# Patient Record
Sex: MALE | Race: WHITE | Employment: FULL TIME | URBAN - METROPOLITAN AREA
[De-identification: names, ages, dates, MRNs, and addresses within clinical notes are randomized per-mention and may not be internally consistent; named-entity substitution may affect disease eponyms.]

---

## 2021-06-23 ENCOUNTER — OFFICE VISIT (OUTPATIENT)
Dept: FAMILY MEDICINE CLINIC | Facility: CLINIC | Age: 31
End: 2021-06-23
Payer: COMMERCIAL

## 2021-06-23 VITALS
RESPIRATION RATE: 18 BRPM | WEIGHT: 212 LBS | BODY MASS INDEX: 28.1 KG/M2 | DIASTOLIC BLOOD PRESSURE: 80 MMHG | TEMPERATURE: 98 F | HEIGHT: 73 IN | SYSTOLIC BLOOD PRESSURE: 122 MMHG | HEART RATE: 62 BPM | OXYGEN SATURATION: 98 %

## 2021-06-23 DIAGNOSIS — Z00.00 ROUTINE GENERAL MEDICAL EXAMINATION AT A HEALTH CARE FACILITY: Primary | ICD-10-CM

## 2021-06-23 DIAGNOSIS — M25.511 CHRONIC RIGHT SHOULDER PAIN: ICD-10-CM

## 2021-06-23 DIAGNOSIS — Z72.0 CHEWING TOBACCO USE: ICD-10-CM

## 2021-06-23 DIAGNOSIS — Z23 NEED FOR VACCINATION: ICD-10-CM

## 2021-06-23 DIAGNOSIS — G89.29 CHRONIC RIGHT SHOULDER PAIN: ICD-10-CM

## 2021-06-23 DIAGNOSIS — T14.90XA SPORTS INJURY: ICD-10-CM

## 2021-06-23 PROCEDURE — 3074F SYST BP LT 130 MM HG: CPT | Performed by: FAMILY MEDICINE

## 2021-06-23 PROCEDURE — 99385 PREV VISIT NEW AGE 18-39: CPT | Performed by: FAMILY MEDICINE

## 2021-06-23 PROCEDURE — 90471 IMMUNIZATION ADMIN: CPT | Performed by: FAMILY MEDICINE

## 2021-06-23 PROCEDURE — 3008F BODY MASS INDEX DOCD: CPT | Performed by: FAMILY MEDICINE

## 2021-06-23 PROCEDURE — 90715 TDAP VACCINE 7 YRS/> IM: CPT | Performed by: FAMILY MEDICINE

## 2021-06-23 PROCEDURE — 3079F DIAST BP 80-89 MM HG: CPT | Performed by: FAMILY MEDICINE

## 2021-06-23 NOTE — PATIENT INSTRUCTIONS
Prevention Guidelines, Men Ages 25 to 44  Screening tests and vaccines are an important part of managing your health. A screening test is done to find possible disorders or diseases in people who don't have any symptoms.  The goal is to find a disease ear vaccine 2 doses; the second dose should be given at least 4 weeks after the first dose   Hepatitis A Men at increased risk for infection – talk with your healthcare provider 2 doses given at least 6 months apart   Hepatitis B Men at increased risk for infe be reminded to avoid intentional tanning and tanning beds. 1Those who are 25years of age, who are not up-to-date on their childhood immunizations, should get all appropriate catch-up vaccines recommended by the CDC.    Melonie last reviewed this educat Using snuff also is called dipping. High levels of nicotine  A person who uses smokeless tobacco gets 3 to 4 times the amount of nicotine as a smoker.  A person who uses 8 to 10 dips or chews a day gets the same amount of nicotine as a heavy smoker who smo sugarless gum, hard candy, beef jerky, sunflower seeds, shredded coconut, raisins, or dried fruit. · Make a list of the reasons you want to quit. Keep it with you and look at it often. · Get involved in healthy activities.  These may be things like liftin

## 2021-06-23 NOTE — PROGRESS NOTES
Junito Thomas is a 31year old male. Patient presents with:  Establish Care  Shoulder Pain: right    HPI:   Junito Thomas is a 31year old male seen for initial visit to establish care.     Patient states had shoulder injury 2 years ago while playing b intolerance and polyuria. Genitourinary: Negative for difficulty urinating, dysuria, frequency and urgency. Musculoskeletal: Negative for arthralgias, gait problem, joint swelling and myalgias. As per HPI   Skin: Negative for rash.    Allergic/Im Cervical back: Normal range of motion and neck supple. Lymphadenopathy:      Cervical: No cervical adenopathy. Skin:     General: Skin is warm. Findings: No rash. Neurological:      General: No focal deficit present.       Mental Status: He is al

## 2021-06-25 ENCOUNTER — LAB ENCOUNTER (OUTPATIENT)
Dept: LAB | Age: 31
End: 2021-06-25
Attending: FAMILY MEDICINE
Payer: COMMERCIAL

## 2021-06-25 DIAGNOSIS — Z00.00 ROUTINE GENERAL MEDICAL EXAMINATION AT A HEALTH CARE FACILITY: ICD-10-CM

## 2021-06-25 PROCEDURE — 80053 COMPREHEN METABOLIC PANEL: CPT

## 2021-06-25 PROCEDURE — 36415 COLL VENOUS BLD VENIPUNCTURE: CPT

## 2021-06-25 PROCEDURE — 84443 ASSAY THYROID STIM HORMONE: CPT

## 2021-06-25 PROCEDURE — 80061 LIPID PANEL: CPT

## 2021-06-25 PROCEDURE — 85025 COMPLETE CBC W/AUTO DIFF WBC: CPT

## 2021-06-30 ENCOUNTER — TELEPHONE (OUTPATIENT)
Dept: ORTHOPEDICS CLINIC | Facility: CLINIC | Age: 31
End: 2021-06-30

## 2021-06-30 DIAGNOSIS — M25.511 RIGHT SHOULDER PAIN, UNSPECIFIED CHRONICITY: Primary | ICD-10-CM

## 2021-06-30 NOTE — TELEPHONE ENCOUNTER
Future Appointments   Date Time Provider Matthias Costa   7/27/2021  4:00 PM Ray Truong MD [facility]     · Pt is coming in for RIGHT shoulder pain  · No imaging   · Please advise if imaging is needed

## 2021-06-30 NOTE — TELEPHONE ENCOUNTER
Xrays ordered. Pt called and notified to come 20-30 minutes before the appointment to have those completed. Pt verbalized understanding. No further questions at this time.

## 2021-07-23 ENCOUNTER — HOSPITAL ENCOUNTER (OUTPATIENT)
Dept: GENERAL RADIOLOGY | Age: 31
Discharge: HOME | End: 2021-07-23
Attending: ORTHOPAEDIC SURGERY
Payer: COMMERCIAL

## 2021-07-23 DIAGNOSIS — M25.511 RIGHT SHOULDER PAIN, UNSPECIFIED CHRONICITY: ICD-10-CM

## 2021-07-23 PROCEDURE — 73030 X-RAY EXAM OF SHOULDER: CPT | Performed by: ORTHOPAEDIC SURGERY

## 2021-07-27 ENCOUNTER — OFFICE VISIT (OUTPATIENT)
Dept: ORTHOPEDICS CLINIC | Facility: CLINIC | Age: 31
End: 2021-07-27
Payer: COMMERCIAL

## 2021-07-27 VITALS — BODY MASS INDEX: 27.43 KG/M2 | WEIGHT: 207 LBS | HEIGHT: 73 IN

## 2021-07-27 DIAGNOSIS — M50.90 CERVICAL DISC DISORDER: ICD-10-CM

## 2021-07-27 DIAGNOSIS — S43.431A TEAR OF RIGHT GLENOID LABRUM, INITIAL ENCOUNTER: Primary | ICD-10-CM

## 2021-07-27 PROCEDURE — 99203 OFFICE O/P NEW LOW 30 MIN: CPT | Performed by: ORTHOPAEDIC SURGERY

## 2021-07-27 PROCEDURE — 3008F BODY MASS INDEX DOCD: CPT | Performed by: ORTHOPAEDIC SURGERY

## 2021-07-27 NOTE — PROGRESS NOTES
Patient is a 82-year-old white male with a 5-year history of right shoulder problems.   Patient states that he was throwing baseball some pitching a number of years ago and he developed abrupt and acute and severe pain of his right shoulder to the point whe spine.  He does not need to come and to see me before we order that but I would like to review the MRI of his shoulder before he makes a decision in that direction.

## 2021-08-10 ENCOUNTER — HOSPITAL ENCOUNTER (OUTPATIENT)
Dept: MRI IMAGING | Age: 31
Discharge: HOME | End: 2021-08-10
Attending: ORTHOPAEDIC SURGERY
Payer: COMMERCIAL

## 2021-08-10 ENCOUNTER — HOSPITAL ENCOUNTER (OUTPATIENT)
Dept: GENERAL RADIOLOGY | Age: 31
Discharge: HOME | End: 2021-08-10
Attending: ORTHOPAEDIC SURGERY
Payer: COMMERCIAL

## 2021-08-10 DIAGNOSIS — M50.90 CERVICAL DISC DISORDER: ICD-10-CM

## 2021-08-10 DIAGNOSIS — S43.431A TEAR OF RIGHT GLENOID LABRUM, INITIAL ENCOUNTER: ICD-10-CM

## 2021-08-10 PROCEDURE — 73221 MRI JOINT UPR EXTREM W/O DYE: CPT | Performed by: ORTHOPAEDIC SURGERY

## 2021-08-10 PROCEDURE — 72050 X-RAY EXAM NECK SPINE 4/5VWS: CPT | Performed by: ORTHOPAEDIC SURGERY

## 2021-08-24 ENCOUNTER — OFFICE VISIT (OUTPATIENT)
Dept: ORTHOPEDICS CLINIC | Facility: CLINIC | Age: 31
End: 2021-08-24
Payer: COMMERCIAL

## 2021-08-24 VITALS — HEIGHT: 73 IN | BODY MASS INDEX: 27.3 KG/M2 | WEIGHT: 206 LBS

## 2021-08-24 DIAGNOSIS — M50.90 CERVICAL DISC DISORDER: Primary | ICD-10-CM

## 2021-08-24 DIAGNOSIS — S43.431D TEAR OF RIGHT GLENOID LABRUM, SUBSEQUENT ENCOUNTER: ICD-10-CM

## 2021-08-24 PROCEDURE — 3008F BODY MASS INDEX DOCD: CPT | Performed by: ORTHOPAEDIC SURGERY

## 2021-08-24 PROCEDURE — 99213 OFFICE O/P EST LOW 20 MIN: CPT | Performed by: ORTHOPAEDIC SURGERY

## 2021-08-24 NOTE — PROGRESS NOTES
Patient is a 44-year-old white male here for follow-up. Patient had a diagnosis of a right shoulder labral tear. I did order an MRI scan of his shoulder. I reviewed this with him it does show him to have a SLAP tear of the shoulder.   I did review the fi

## 2021-12-09 ENCOUNTER — MED REC SCAN ONLY (OUTPATIENT)
Dept: FAMILY MEDICINE CLINIC | Facility: CLINIC | Age: 31
End: 2021-12-09

## 2022-06-03 ENCOUNTER — OFFICE VISIT (OUTPATIENT)
Dept: FAMILY MEDICINE CLINIC | Facility: CLINIC | Age: 32
End: 2022-06-03
Payer: COMMERCIAL

## 2022-06-03 VITALS
SYSTOLIC BLOOD PRESSURE: 118 MMHG | WEIGHT: 215 LBS | OXYGEN SATURATION: 98 % | TEMPERATURE: 98 F | HEART RATE: 78 BPM | HEIGHT: 73 IN | DIASTOLIC BLOOD PRESSURE: 84 MMHG | BODY MASS INDEX: 28.49 KG/M2 | RESPIRATION RATE: 18 BRPM

## 2022-06-03 DIAGNOSIS — Z72.0 CHEWING TOBACCO USE: ICD-10-CM

## 2022-06-03 DIAGNOSIS — Z00.00 ROUTINE GENERAL MEDICAL EXAMINATION AT A HEALTH CARE FACILITY: Primary | ICD-10-CM

## 2022-06-03 PROCEDURE — 99395 PREV VISIT EST AGE 18-39: CPT | Performed by: FAMILY MEDICINE

## 2022-06-03 PROCEDURE — 3074F SYST BP LT 130 MM HG: CPT | Performed by: FAMILY MEDICINE

## 2022-06-03 PROCEDURE — 3008F BODY MASS INDEX DOCD: CPT | Performed by: FAMILY MEDICINE

## 2022-06-03 PROCEDURE — 3079F DIAST BP 80-89 MM HG: CPT | Performed by: FAMILY MEDICINE

## 2022-11-17 ENCOUNTER — LAB ENCOUNTER (OUTPATIENT)
Dept: LAB | Age: 32
End: 2022-11-17
Attending: FAMILY MEDICINE
Payer: COMMERCIAL

## 2022-11-17 DIAGNOSIS — Z00.00 ROUTINE GENERAL MEDICAL EXAMINATION AT A HEALTH CARE FACILITY: ICD-10-CM

## 2022-11-17 LAB
ALBUMIN SERPL-MCNC: 4.6 G/DL (ref 3.4–5)
ALBUMIN/GLOB SERPL: 1.2 {RATIO} (ref 1–2)
ALP LIVER SERPL-CCNC: 63 U/L
ALT SERPL-CCNC: 89 U/L
ANION GAP SERPL CALC-SCNC: 7 MMOL/L (ref 0–18)
AST SERPL-CCNC: 34 U/L (ref 15–37)
BASOPHILS # BLD AUTO: 0.04 X10(3) UL (ref 0–0.2)
BASOPHILS NFR BLD AUTO: 0.6 %
BILIRUB SERPL-MCNC: 0.5 MG/DL (ref 0.1–2)
BUN BLD-MCNC: 16 MG/DL (ref 7–18)
CALCIUM BLD-MCNC: 9.9 MG/DL (ref 8.5–10.1)
CHLORIDE SERPL-SCNC: 103 MMOL/L (ref 98–112)
CHOLEST SERPL-MCNC: 262 MG/DL (ref ?–200)
CO2 SERPL-SCNC: 28 MMOL/L (ref 21–32)
CREAT BLD-MCNC: 0.92 MG/DL
EOSINOPHIL # BLD AUTO: 0.13 X10(3) UL (ref 0–0.7)
EOSINOPHIL NFR BLD AUTO: 1.8 %
ERYTHROCYTE [DISTWIDTH] IN BLOOD BY AUTOMATED COUNT: 12.7 %
FASTING PATIENT LIPID ANSWER: YES
FASTING STATUS PATIENT QL REPORTED: YES
GFR SERPLBLD BASED ON 1.73 SQ M-ARVRAT: 115 ML/MIN/1.73M2 (ref 60–?)
GLOBULIN PLAS-MCNC: 3.7 G/DL (ref 2.8–4.4)
GLUCOSE BLD-MCNC: 84 MG/DL (ref 70–99)
HCT VFR BLD AUTO: 46 %
HDLC SERPL-MCNC: 45 MG/DL (ref 40–59)
HGB BLD-MCNC: 15.3 G/DL
IMM GRANULOCYTES # BLD AUTO: 0.01 X10(3) UL (ref 0–1)
IMM GRANULOCYTES NFR BLD: 0.1 %
LDLC SERPL CALC-MCNC: 191 MG/DL (ref ?–100)
LYMPHOCYTES # BLD AUTO: 2.66 X10(3) UL (ref 1–4)
LYMPHOCYTES NFR BLD AUTO: 37.3 %
MCH RBC QN AUTO: 30.4 PG (ref 26–34)
MCHC RBC AUTO-ENTMCNC: 33.3 G/DL (ref 31–37)
MCV RBC AUTO: 91.5 FL
MONOCYTES # BLD AUTO: 0.69 X10(3) UL (ref 0.1–1)
MONOCYTES NFR BLD AUTO: 9.7 %
NEUTROPHILS # BLD AUTO: 3.61 X10 (3) UL (ref 1.5–7.7)
NEUTROPHILS # BLD AUTO: 3.61 X10(3) UL (ref 1.5–7.7)
NEUTROPHILS NFR BLD AUTO: 50.5 %
NONHDLC SERPL-MCNC: 217 MG/DL (ref ?–130)
OSMOLALITY SERPL CALC.SUM OF ELEC: 286 MOSM/KG (ref 275–295)
PLATELET # BLD AUTO: 286 10(3)UL (ref 150–450)
POTASSIUM SERPL-SCNC: 4.5 MMOL/L (ref 3.5–5.1)
PROT SERPL-MCNC: 8.3 G/DL (ref 6.4–8.2)
RBC # BLD AUTO: 5.03 X10(6)UL
SODIUM SERPL-SCNC: 138 MMOL/L (ref 136–145)
TRIGL SERPL-MCNC: 139 MG/DL (ref 30–149)
TSI SER-ACNC: 1.64 MIU/ML (ref 0.36–3.74)
VLDLC SERPL CALC-MCNC: 29 MG/DL (ref 0–30)
WBC # BLD AUTO: 7.1 X10(3) UL (ref 4–11)

## 2022-11-17 PROCEDURE — 80053 COMPREHEN METABOLIC PANEL: CPT

## 2022-11-17 PROCEDURE — 36415 COLL VENOUS BLD VENIPUNCTURE: CPT

## 2022-11-17 PROCEDURE — 84443 ASSAY THYROID STIM HORMONE: CPT

## 2022-11-17 PROCEDURE — 85025 COMPLETE CBC W/AUTO DIFF WBC: CPT

## 2022-11-17 PROCEDURE — 80061 LIPID PANEL: CPT

## 2022-11-30 ENCOUNTER — MED REC SCAN ONLY (OUTPATIENT)
Dept: FAMILY MEDICINE CLINIC | Facility: CLINIC | Age: 32
End: 2022-11-30

## 2023-06-09 ENCOUNTER — OFFICE VISIT (OUTPATIENT)
Dept: FAMILY MEDICINE CLINIC | Facility: CLINIC | Age: 33
End: 2023-06-09
Payer: COMMERCIAL

## 2023-06-09 VITALS
BODY MASS INDEX: 29.42 KG/M2 | WEIGHT: 222 LBS | TEMPERATURE: 98 F | HEART RATE: 82 BPM | RESPIRATION RATE: 18 BRPM | HEIGHT: 73 IN | OXYGEN SATURATION: 98 % | DIASTOLIC BLOOD PRESSURE: 82 MMHG | SYSTOLIC BLOOD PRESSURE: 128 MMHG

## 2023-06-09 DIAGNOSIS — M62.838 TRAPEZIUS MUSCLE SPASM: ICD-10-CM

## 2023-06-09 DIAGNOSIS — E66.3 OVERWEIGHT (BMI 25.0-29.9): ICD-10-CM

## 2023-06-09 DIAGNOSIS — Z72.0 CHEWING TOBACCO USE: ICD-10-CM

## 2023-06-09 DIAGNOSIS — M25.511 CHRONIC RIGHT SHOULDER PAIN: ICD-10-CM

## 2023-06-09 DIAGNOSIS — Z00.00 ROUTINE GENERAL MEDICAL EXAMINATION AT A HEALTH CARE FACILITY: Primary | ICD-10-CM

## 2023-06-09 DIAGNOSIS — G89.29 CHRONIC RIGHT SHOULDER PAIN: ICD-10-CM

## 2023-06-09 PROCEDURE — 3079F DIAST BP 80-89 MM HG: CPT | Performed by: FAMILY MEDICINE

## 2023-06-09 PROCEDURE — 99395 PREV VISIT EST AGE 18-39: CPT | Performed by: FAMILY MEDICINE

## 2023-06-09 PROCEDURE — 3008F BODY MASS INDEX DOCD: CPT | Performed by: FAMILY MEDICINE

## 2023-06-09 PROCEDURE — 3074F SYST BP LT 130 MM HG: CPT | Performed by: FAMILY MEDICINE

## 2023-07-26 ENCOUNTER — TELEPHONE (OUTPATIENT)
Dept: FAMILY MEDICINE CLINIC | Facility: CLINIC | Age: 33
End: 2023-07-26

## 2023-07-26 DIAGNOSIS — J30.1 SEASONAL ALLERGIC RHINITIS DUE TO POLLEN: Primary | ICD-10-CM

## 2023-07-26 RX ORDER — LORATADINE PSEUDOEPHEDRINE SULFATE 10; 240 MG/1; MG/1
1 TABLET, EXTENDED RELEASE ORAL DAILY
Qty: 30 TABLET | Refills: 2 | Status: SHIPPED | OUTPATIENT
Start: 2023-07-26

## 2023-07-26 NOTE — TELEPHONE ENCOUNTER
Allergies have been terrible the past couple of days Claritin D is the only thing that works. Zyrtec not helping.  Please Advise

## 2024-02-12 ENCOUNTER — APPOINTMENT (OUTPATIENT)
Dept: GENERAL RADIOLOGY | Age: 34
End: 2024-02-12
Attending: NURSE PRACTITIONER
Payer: COMMERCIAL

## 2024-02-12 ENCOUNTER — HOSPITAL ENCOUNTER (OUTPATIENT)
Age: 34
Discharge: HOME | End: 2024-02-12
Payer: COMMERCIAL

## 2024-02-12 VITALS
SYSTOLIC BLOOD PRESSURE: 159 MMHG | HEIGHT: 72 IN | BODY MASS INDEX: 29.12 KG/M2 | OXYGEN SATURATION: 100 % | RESPIRATION RATE: 16 BRPM | HEART RATE: 109 BPM | TEMPERATURE: 98 F | WEIGHT: 215 LBS | DIASTOLIC BLOOD PRESSURE: 95 MMHG

## 2024-02-12 DIAGNOSIS — K59.00 CONSTIPATION, UNSPECIFIED CONSTIPATION TYPE: ICD-10-CM

## 2024-02-12 DIAGNOSIS — R10.9 ABDOMINAL PAIN OF UNKNOWN ETIOLOGY: Primary | ICD-10-CM

## 2024-02-12 PROCEDURE — 99203 OFFICE O/P NEW LOW 30 MIN: CPT | Performed by: NURSE PRACTITIONER

## 2024-02-12 PROCEDURE — 74018 RADEX ABDOMEN 1 VIEW: CPT | Performed by: NURSE PRACTITIONER

## 2024-02-12 RX ORDER — POLYETHYLENE GLYCOL 3350 17 G/17G
17 POWDER, FOR SOLUTION ORAL DAILY PRN
Qty: 12 EACH | Refills: 0 | Status: SHIPPED | OUTPATIENT
Start: 2024-02-12 | End: 2024-03-13

## 2024-02-12 NOTE — ED PROVIDER NOTES
Patient Seen in: [facility]      History     Chief Complaint   Patient presents with    Abdomen/Flank Pain     Stated Complaint: abd pain    Subjective:   HPI    33-year-old male presents to the IC with complaints of right upper abdominal pain for last couple days.  Patient states last bowel movement was Wednesday has been strain to the bathroom but nothing is coming out.  Denies bloating denies fever denies any blood in the stool no history of constipation in the past.  History of appendectomy many years ago.  No other issues complaints or concerns.The patient's medication list, past medical history and social history elements as listed in today's nurse's notes were reviewed and agreed (except as otherwise stated in the HPI).  The patient's family history reviewed and determined to be noncontributory to the presenting problem.        Objective:   Past Medical History:   Diagnosis Date    Allergic rhinitis               Past Surgical History:   Procedure Laterality Date    APPENDECTOMY                  Social History     Socioeconomic History    Marital status:      Spouse name: Vera   Occupational History    Occupation:     Tobacco Use    Smoking status: Never    Smokeless tobacco: Current    Tobacco comments:     Chewing tobacco   Vaping Use    Vaping Use: Never used   Substance and Sexual Activity    Alcohol use: Yes     Comment: Occasionally    Drug use: Never    Sexual activity: Yes     Partners: Female   Other Topics Concern    Caffeine Concern No    Exercise Yes    Seat Belt Yes    Special Diet No    Stress Concern No    Weight Concern No              Review of Systems   Gastrointestinal:  Positive for abdominal pain and constipation.       Positive for stated complaint: abd pain  Other systems are as noted in HPI.  Constitutional and vital signs reviewed.      All other systems reviewed and negative except as noted above.    Physical Exam     ED Triage Vitals [02/12/24 0908]    BP (!) 159/95   Pulse 109   Resp 16   Temp 98.2 °F (36.8 °C)   Temp src Temporal   SpO2 100 %   O2 Device None (Room air)       Current:BP (!) 159/95   Pulse 109   Temp 98.2 °F (36.8 °C) (Temporal)   Resp 16   Ht 182.9 cm (6')   Wt 97.5 kg   SpO2 100%   BMI 29.16 kg/m²         Physical Exam    GENERAL: The patient is well-developed well-nourished nontoxic, non-ill-appearing  HEENT: Normocephalic.  Atraumatic.  Extraocular motions are intact  NECK: Supple.  No meningitic signs are noted.   CHEST/LUNGS: Clear to auscultation.  There is no respiratory distress noted.  HEART/CARDIOVASCULAR: Regular.  There is no tachycardia.   SKIN: There is no rash.  NEURO: The patient is awake, alert, and oriented.  The patient is cooperative.  Abd: Soft, nondistended mild tenderness to right upper quadrant negative Salmon sign negative rebound, sore scars noted in the abdomen, bowel sounds heard all 4 quadrants normal active  ED Course   Labs Reviewed - No data to display  XR ABDOMEN (1 VIEW) (CPT=74018)    Result Date: 2/12/2024  PROCEDURE:  XR ABDOMEN (1 VIEW) (CPT=74018)  INDICATIONS:  abd pain  COMPARISON:  None.  TECHNIQUE:  Supine AP view was obtained.  PATIENT STATED HISTORY: (As transcribed by Technologist)  Patient states he has had right sided mid abdomen pain and constipation for the past 5 days. Patient has history of Appendectomy.              CONCLUSION:    Mild-moderate stool in the right colon, minimal stool throughout the rest the colon.  Minimal gas overall in the GI tract.  No signs by x-ray of bowel obstruction.  No signs of free air.  Clips right abdomen from appendectomy.  LOCATION:  [facility]   Dictated by (CST): Bernardo Meyer MD on 2/12/2024 at 9:56 AM     Finalized by (CST): Bernardo Meyer MD on 2/12/2024 at 9:57 AM               MDM   Patient presents with abdominal pain of nonemergent etiology/constipation, no abdominal bruit, no relation to fatty meals, negative Salmon's, no radiation to back, no  CVA tenderness, no history of alcohol abuse, no history of diverticular or bloody stool.  Patient has fluctuance and normal bowel movements.  Patient nontoxic in appearance with normal vitals.  Unlikely AAA, cholecystitis, pancreatitis, small bowel obstruction, appendicitis, mesenteric ischemia, nephrolithiasis, pyelonephritis, or diverticulitis.  Patient tolerating p.o. and able to control pain with distraction n.p.o. medication.  Plan discharge home with return precautions and prescription for nausea  Please note that this report has been produced using speech recognition software and may contain errors related to that system including, but not limited to, errors in grammar, punctuation, and spelling, as well as words and phrases that possibly may have been recognized inappropriately.  If there are any questions or concerns, contact the dictating provider for clarification.                                 Medical Decision Making      Disposition and Plan     Clinical Impression:  1. Abdominal pain of unknown etiology    2. Constipation, unspecified constipation type         Disposition:  Discharge  2/12/2024 10:09 am    Follow-up:  Jennifer Dexter MD  [address] [place] [id number]  [phone]                Medications Prescribed:  Discharge Medication List as of 2/12/2024 10:11 AM        START taking these medications    Details   polyethylene glycol, PEG 3350, 17 g Oral Powd Pack Take 17 g by mouth daily as needed., Normal, Disp-12 each, R-0

## 2024-02-12 NOTE — DISCHARGE INSTRUCTIONS
With your primary care provider for all of your healthcare needs  Increase fluids keep well-hydrated  Take the MiraLAX once daily  Stool softener may be helpful  Over-the-counter mag citrate also may be helpful  You can also try a fleets enema at home  Return to the emergency room for symptoms or concerns

## 2024-07-30 ENCOUNTER — OFFICE VISIT (OUTPATIENT)
Dept: FAMILY MEDICINE CLINIC | Facility: CLINIC | Age: 34
End: 2024-07-30
Payer: COMMERCIAL

## 2024-07-30 VITALS
TEMPERATURE: 98 F | WEIGHT: 218 LBS | HEIGHT: 73 IN | RESPIRATION RATE: 18 BRPM | HEART RATE: 71 BPM | DIASTOLIC BLOOD PRESSURE: 76 MMHG | BODY MASS INDEX: 28.89 KG/M2 | SYSTOLIC BLOOD PRESSURE: 130 MMHG | OXYGEN SATURATION: 98 %

## 2024-07-30 DIAGNOSIS — Z00.00 ROUTINE GENERAL MEDICAL EXAMINATION AT A HEALTH CARE FACILITY: Primary | ICD-10-CM

## 2024-07-30 DIAGNOSIS — Z72.0 CHEWING TOBACCO USE: ICD-10-CM

## 2024-07-30 PROCEDURE — 99395 PREV VISIT EST AGE 18-39: CPT | Performed by: FAMILY MEDICINE

## 2024-07-30 NOTE — PROGRESS NOTES
Jens Rothman is a 33 year old male.  Chief Complaint   Patient presents with    Physical     HPI:   Jens Rothman is a 33 year old male with no significant past medical history seen for his annual physical.    Tries to eat healthy and is active, plays golf.    Continues to chew tobacco, plans on quitting.    PAST MEDICAL HISTORY:     Past Medical History:    Allergic rhinitis     PAST SURGICAL HISTORY:     Past Surgical History:   Procedure Laterality Date    Appendectomy       ALLERGY:   No Known Allergies  MEDICATIONS:     Current Outpatient Medications   Medication Sig Dispense Refill    loratadine-pseudoephedrine ER (CLARITIN-D 24 HOUR)  MG Oral Tablet 24 Hr Take 1 tablet by mouth daily. (Patient taking differently: Take 1 tablet by mouth daily. As needed) 30 tablet 2     FAMILY HISTORY:     Family History   Problem Relation Age of Onset    Diabetes Paternal Grandmother        SOCIAL HISTORY:     Social History     Socioeconomic History    Marital status:      Spouse name: Vera   Occupational History    Occupation:     Tobacco Use    Smoking status: Never    Smokeless tobacco: Current    Tobacco comments:     Chewing tobacco   Vaping Use    Vaping status: Never Used   Substance and Sexual Activity    Alcohol use: Yes     Comment: Occasionally    Drug use: Never    Sexual activity: Yes     Partners: Female   Other Topics Concern    Caffeine Concern No    Exercise No    Seat Belt No    Special Diet No    Stress Concern No    Weight Concern No     REVIEW OF SYSTEMS:   Review of Systems   Constitutional:  Negative for appetite change, fatigue, fever and unexpected weight change.   HENT:  Negative for congestion, ear pain, hearing loss and sore throat.    Eyes:  Negative for discharge, redness and visual disturbance.   Respiratory:  Negative for cough, chest tightness and shortness of breath.    Cardiovascular:  Negative for chest pain and palpitations.   Gastrointestinal:  Negative  for abdominal pain, blood in stool, constipation and nausea.   Endocrine: Negative for cold intolerance, heat intolerance and polyuria.   Genitourinary:  Negative for difficulty urinating, dysuria, frequency and urgency.   Musculoskeletal:  Negative for arthralgias, gait problem, joint swelling, myalgias and neck pain.   Skin:  Negative for rash.   Allergic/Immunologic: Negative for food allergies.   Neurological:  Negative for dizziness, weakness, numbness and headaches.   Hematological:  Negative for adenopathy. Does not bruise/bleed easily.   Psychiatric/Behavioral:  Negative for dysphoric mood and sleep disturbance. The patient is not nervous/anxious.         PHYSICAL EXAM:   /76   Pulse 71   Temp 98.2 °F (36.8 °C) (Temporal)   Resp 18   Ht 6' 1\" (1.854 m)   Wt 218 lb (98.9 kg)   SpO2 98%   BMI 28.76 kg/m²     Physical Exam  Constitutional:       General: He is not in acute distress.     Appearance: Normal appearance. He is well-developed and normal weight.   HENT:      Head: Normocephalic and atraumatic.      Right Ear: Tympanic membrane and external ear normal.      Left Ear: Tympanic membrane and external ear normal.      Nose: Nose normal.   Eyes:      Extraocular Movements: Extraocular movements intact.      Conjunctiva/sclera: Conjunctivae normal.      Pupils: Pupils are equal, round, and reactive to light.   Neck:      Thyroid: No thyromegaly.   Cardiovascular:      Rate and Rhythm: Normal rate and regular rhythm.      Heart sounds: Normal heart sounds. No murmur heard.  Pulmonary:      Effort: Pulmonary effort is normal. No respiratory distress.      Breath sounds: Normal breath sounds.   Chest:      Chest wall: No tenderness.   Abdominal:      General: Bowel sounds are normal. There is no distension.      Palpations: Abdomen is soft. There is no hepatomegaly, splenomegaly or mass.      Tenderness: There is no abdominal tenderness.      Hernia: No hernia is present.   Musculoskeletal:          General: Normal range of motion.      Cervical back: Normal range of motion and neck supple.      Right lower leg: No edema.      Left lower leg: No edema.   Lymphadenopathy:      Cervical: No cervical adenopathy.   Skin:     General: Skin is warm.      Findings: No rash.   Neurological:      General: No focal deficit present.      Mental Status: He is alert and oriented to person, place, and time.      Deep Tendon Reflexes: Reflexes are normal and symmetric.   Psychiatric:         Mood and Affect: Mood normal.         Behavior: Behavior normal.         Thought Content: Thought content normal.         Judgment: Judgment normal.        ASSESSMENT AND PLAN:   Jens was seen today for physical.    Diagnoses and all orders for this visit:    Routine general medical examination at a health care facility  -     CBC With Differential With Platelet; Future  -     Assay, Thyroid Stim Hormone; Future  -     Lipid Panel; Future  -     Comp Metabolic Panel (14); Future    Chewing tobacco use       - adverse affects of tobacco use including oral cancer       - encouraged to quit.    Health Maintenance   Topic Date Due    COVID-19 Vaccine (3 - 2023-24 season) 09/01/2023    Tobacco Cessation Counseling  Never done    Annual Physical  Done today    Influenza Vaccine (1) 10/01/2024    DTaP,Tdap,and Td Vaccines (2 - Td or Tdap) 06/23/2031    Annual Depression Screening  Completed    Pneumococcal Vaccine: Birth to 64yrs  Aged Out          The 21st Century Cures Act makes medical notes like these available to patients in the interest of transparency. Please be advised this is a medical document. Medical documents are intended to carry relevant information, facts as evident, and the clinical opinion of the practitioner. The medical note is intended as peer to peer communication and may appear blunt or direct. It is written in medical language and may contain abbreviations or verbiage that are unfamiliar.           The 21st Century  Cures Act makes medical notes like these available to patients in the interest of transparency. Please be advised this is a medical document. Medical documents are intended to carry relevant information, facts as evident, and the clinical opinion of the practitioner. The medical note is intended as peer to peer communication and may appear blunt or direct. It is written in medical language and may contain abbreviations or verbiage that are unfamiliar.

## 2024-12-02 ENCOUNTER — HOSPITAL ENCOUNTER (OUTPATIENT)
Age: 34
Discharge: HOME | End: 2024-12-02
Payer: COMMERCIAL

## 2024-12-02 VITALS
HEART RATE: 82 BPM | DIASTOLIC BLOOD PRESSURE: 63 MMHG | RESPIRATION RATE: 16 BRPM | OXYGEN SATURATION: 98 % | TEMPERATURE: 98 F | SYSTOLIC BLOOD PRESSURE: 127 MMHG

## 2024-12-02 DIAGNOSIS — H93.12 TINNITUS OF LEFT EAR: Primary | ICD-10-CM

## 2024-12-02 PROCEDURE — 99203 OFFICE O/P NEW LOW 30 MIN: CPT | Performed by: PHYSICIAN ASSISTANT

## 2024-12-02 RX ORDER — FLUTICASONE PROPIONATE 50 MCG
2 SPRAY, SUSPENSION (ML) NASAL DAILY
Qty: 16 G | Refills: 0 | Status: SHIPPED | OUTPATIENT
Start: 2024-12-02 | End: 2025-01-01

## 2024-12-02 NOTE — DISCHARGE INSTRUCTIONS
Start an antihistamine such as Zyrtec or Claritin  Use Flonase for at least 2 weeks to help with congestion behind the ear  Call make an appoint with the ENT  Return to the ER symptoms worsen as we discussed whooshing sound pulsating noise, headache, dizziness

## 2024-12-02 NOTE — ED PROVIDER NOTES
Patient Seen in: [facility]      History     Chief Complaint   Patient presents with    Tinnitus     Stated Complaint: ringing ears    Subjective:   The history is provided by the patient.         34-year-old male who presents to the immediate care due to ringing in his ear is starting roughly 1 week ago.  Patient noticed it mainly on the left ear a constant ringing some high-pitched nature when moving the head.  There is no whooshing sound, pulsation.  There is no headache or dizziness.  No ear pain or drainage.  No known injury or trauma.  Does not work in a loud environment.  Does feel the need to pop his ears as if there is fluid behind his ears.  No fevers nasal congestion.  No history of similar issues in the past.    Objective:     Past Medical History:    Allergic rhinitis              Past Surgical History:   Procedure Laterality Date    Appendectomy                  Social History     Socioeconomic History    Marital status:      Spouse name: Vera   Occupational History    Occupation:     Tobacco Use    Smoking status: Never    Smokeless tobacco: Current    Tobacco comments:     Chewing tobacco   Vaping Use    Vaping status: Never Used   Substance and Sexual Activity    Alcohol use: Yes     Comment: Occasionally    Drug use: Never    Sexual activity: Yes     Partners: Female   Other Topics Concern    Caffeine Concern No    Exercise No    Seat Belt No    Special Diet No    Stress Concern No    Weight Concern No              Review of Systems   Constitutional: Negative.    HENT:  Positive for tinnitus. Negative for congestion, ear discharge, ear pain, hearing loss, sinus pressure, sinus pain, sore throat, trouble swallowing and voice change.        Positive for stated complaint: ringing ears  Other systems are as noted in HPI.  Constitutional and vital signs reviewed.      All other systems reviewed and negative except as noted above.    Physical Exam     ED Triage Vitals  [12/02/24 0858]   /63   Pulse 82   Resp 16   Temp 97.7 °F (36.5 °C)   Temp src Temporal   SpO2 98 %   O2 Device None (Room air)       Current Vitals:   Vital Signs  BP: 127/63  Pulse: 82  Resp: 16  Temp: 97.7 °F (36.5 °C)  Temp src: Temporal    Oxygen Therapy  SpO2: 98 %  O2 Device: None (Room air)        Physical Exam  Vitals and nursing note reviewed.   Constitutional:       General: He is not in acute distress.     Appearance: Normal appearance.   HENT:      Head: Normocephalic.      Right Ear: Ear canal and external ear normal.      Left Ear: Ear canal and external ear normal.      Ears:      Comments: Right TM with mild scarring.  Left TM with scarring along the inferior aspect.  Some serous fluid noted.  No erythema not bulging.  No mastoid tenderness bilaterally.     Nose: Nose normal.      Mouth/Throat:      Mouth: Mucous membranes are moist.   Eyes:      Extraocular Movements: Extraocular movements intact.      Conjunctiva/sclera: Conjunctivae normal.      Pupils: Pupils are equal, round, and reactive to light.   Pulmonary:      Effort: Pulmonary effort is normal.   Musculoskeletal:         General: Normal range of motion.      Cervical back: Normal range of motion.   Lymphadenopathy:      Cervical: No cervical adenopathy.   Skin:     General: Skin is warm.   Neurological:      General: No focal deficit present.      Mental Status: He is alert and oriented to person, place, and time.   Psychiatric:         Mood and Affect: Mood normal.         Behavior: Behavior normal.             ED Course   Labs Reviewed - No data to display                MDM   Ddx -tinnitus and as result of serous effusion, cerumen impaction, medications    On exam the patient is afebrile nontoxic.  Vital signs are stable.  Gross hearing is within normal limits.  No cerumen impaction noted.  Mild serous effusion is noted on the left no signs of infection.  Scarring on bilateral TMs is noted.  Rest exam is unremarkable.  Will  start conservatively with nasal sprays and decongestants to help with fluid in the ear to see if this resolves her cystitis however ENT follow-up was provided for further evaluation.  Strict ER precautions were discussed all questions were answered        Medical Decision Making  Problems Addressed:  Tinnitus of left ear: acute illness or injury    Risk  OTC drugs.  Prescription drug management.        Disposition and Plan     Clinical Impression:  1. Tinnitus of left ear         Disposition:  Discharge  12/2/2024  9:17 am    Follow-up:  Jennifer Dexter MD  [address] [address]  [phone]          Jarad Coffman MD  [address] [address]  [phone]                Medications Prescribed:  Discharge Medication List as of 12/2/2024  9:18 AM        START taking these medications    Details   fluticasone propionate 50 MCG/ACT Nasal Suspension 2 sprays by Nasal route daily., Normal, Disp-16 g, R-0                 Supplementary Documentation:

## 2024-12-11 ENCOUNTER — TELEPHONE (OUTPATIENT)
Dept: FAMILY MEDICINE CLINIC | Facility: CLINIC | Age: 34
End: 2024-12-11

## 2024-12-11 DIAGNOSIS — H93.13 TINNITUS OF BOTH EARS: Primary | ICD-10-CM

## 2024-12-13 ENCOUNTER — OFFICE VISIT (OUTPATIENT)
Facility: LOCATION | Age: 34
End: 2024-12-13
Payer: COMMERCIAL

## 2024-12-13 DIAGNOSIS — H93.19 TINNITUS, UNSPECIFIED LATERALITY: Primary | ICD-10-CM

## 2024-12-13 DIAGNOSIS — J34.89 SINUS PRESSURE: ICD-10-CM

## 2024-12-13 DIAGNOSIS — H93.8X3 PRESSURE SENSATION IN BOTH EARS: ICD-10-CM

## 2024-12-13 PROCEDURE — 92504 EAR MICROSCOPY EXAMINATION: CPT | Performed by: STUDENT IN AN ORGANIZED HEALTH CARE EDUCATION/TRAINING PROGRAM

## 2024-12-13 PROCEDURE — 99203 OFFICE O/P NEW LOW 30 MIN: CPT | Performed by: STUDENT IN AN ORGANIZED HEALTH CARE EDUCATION/TRAINING PROGRAM

## 2024-12-13 NOTE — PROGRESS NOTES
[place]  OTOLARYNGOLOGY - HEAD & NECK SURGERY    12/13/2024     Reason for Consultation:   Tinnitus, sinus pressure    History of Present Illness:   Patient is a pleasant 34 year old male who is being seen for approximately 3-4 weeks of high-pitched ringing sensation consistent with tinnitus.  The patient is states this started just prior to Thanksgiving.  He does note that his tinnitus fluctuates but at times worsens especially when he is low on sleep.  He also has been having some head pressure and sinus pressure.  Also endorses some ear pressure.  He denies any vertigo or dizziness.  He does note that his tinnitus increases when he turns his head to the left.  He does not have any sort of pulsatile tinnitus.  He has not had any previous issues with his ears.  He states that his stress level is high and he was drinking a lot of caffeine but is cut back recently.  At 1 point he was drinking 2 cups of coffee, 1 soda, and 1 energy drink daily.  He has since cut back but continues to be symptomatic.  He denies any ear drainage.    Past Medical History  Past Medical History:    Allergic rhinitis       Past Surgical History  Past Surgical History:   Procedure Laterality Date    Appendectomy         Family History  Family History   Problem Relation Age of Onset    Diabetes Paternal Grandmother        Social History  Pediatric History   Patient Parents    Not on file     Other Topics Concern    Caffeine Concern No    Exercise No    Seat Belt No    Special Diet No    Stress Concern No    Weight Concern No   Social History Narrative    Not on file           Current Medications:  Current Outpatient Medications   Medication Sig Dispense Refill    fluticasone propionate 50 MCG/ACT Nasal Suspension 2 sprays by Nasal route daily. 16 g 0    loratadine-pseudoephedrine ER (CLARITIN-D 24 HOUR)  MG Oral Tablet 24 Hr Take 1 tablet by mouth daily. (Patient taking differently: Take 1 tablet by mouth daily. As needed) 30 tablet 2        Allergies  Allergies[1]    Review of Systems:   A comprehensive 10 point review of systems was completed.  Pertinent positives and negatives noted in the the HPI.    Physical Exam:   There were no vitals taken for this visit.    GENERAL: No acute distress, Comfortable appearing  FACE: HB 1/6, Normal Animation  HEAD: Normocephalic  EYES: EOMI, pupils equil  EARS: Bilateral Auricles Symmetric  NOSE: Nares patent bilaterally, anterior nasal endoscopy reveals mildly deviated septum to the left, bilateral inferior turbinates with mild hypertrophy, and the middle meatus appears patent with no edema or pus drainage  ORAL CAVITY: Tongue mobile, Oropharynx clear, Floor of mouth clear, Posterior oropharynx normal  NECK: No palpable lymphadenopathy, thyroid not palpable, nontender    Canals:  Right: Clear  Left: Clear    Tympanic Membranes:  Right: Normal tympanic membrane, with no retraction, middle ear space clear  Left: Normal tympanic membrane. with no retraction middle ear space clear    TM Visualized Method:   Right TM examined via otomicroscopy.   Left TM examined via otomicroscopy.      Results:     Laboratory Data:  Lab Results   Component Value Date    WBC 7.1 11/17/2022    HGB 15.3 11/17/2022    HCT 46.0 11/17/2022    .0 11/17/2022    CREATSERUM 0.92 11/17/2022    BUN 16 11/17/2022     11/17/2022    K 4.5 11/17/2022     11/17/2022    CO2 28.0 11/17/2022    GLU 84 11/17/2022    CA 9.9 11/17/2022    ALB 4.6 11/17/2022    ALKPHO 63 11/17/2022    TP 8.3 (H) 11/17/2022    AST 34 11/17/2022    ALT 89 (H) 11/17/2022    TSH 1.640 11/17/2022         Imaging:  No results found.      Impression:       ICD-10-CM    1. Tinnitus, unspecified laterality  H93.19 OP REFERRAL TO AUDIOLOGY      2. Sinus pressure  J34.89       3. Pressure sensation in both ears  H93.8X3            Recommendations:  I discussed with the patient that his tinnitus may be related to an atypical form of migraine headaches.  I  discussed certain triggers such as irregular sleep patterns, caffeine intake, and stress levels.  I would like for him to obtain audiogram and if this is normal I will consider providing him with neurology referral.    Thank you for allowing me to participate in the care of your patient.    Gian Leong, DO   Otolaryngology/Rhinology, Sinus, and Endoscopic Skull Base Surgery  [address] [address]  Phone [phone]  Fax [phone]  12/13/2024  5:05 PM  12/13/2024          [1] No Known Allergies

## 2025-03-19 ENCOUNTER — HOSPITAL ENCOUNTER (OUTPATIENT)
Age: 35
Discharge: HOME | End: 2025-03-19
Payer: COMMERCIAL

## 2025-03-19 ENCOUNTER — APPOINTMENT (OUTPATIENT)
Dept: GENERAL RADIOLOGY | Age: 35
End: 2025-03-19
Attending: NURSE PRACTITIONER
Payer: COMMERCIAL

## 2025-03-19 VITALS
OXYGEN SATURATION: 97 % | WEIGHT: 215 LBS | BODY MASS INDEX: 28.49 KG/M2 | DIASTOLIC BLOOD PRESSURE: 85 MMHG | TEMPERATURE: 98 F | SYSTOLIC BLOOD PRESSURE: 126 MMHG | HEIGHT: 73 IN | RESPIRATION RATE: 20 BRPM | HEART RATE: 75 BPM

## 2025-03-19 DIAGNOSIS — J40 BRONCHITIS: Primary | ICD-10-CM

## 2025-03-19 DIAGNOSIS — R05.9 COUGH: ICD-10-CM

## 2025-03-19 LAB — SARS-COV-2 RNA RESP QL NAA+PROBE: NOT DETECTED

## 2025-03-19 PROCEDURE — 71046 X-RAY EXAM CHEST 2 VIEWS: CPT | Performed by: NURSE PRACTITIONER

## 2025-03-19 PROCEDURE — 99214 OFFICE O/P EST MOD 30 MIN: CPT | Performed by: NURSE PRACTITIONER

## 2025-03-19 PROCEDURE — U0002 COVID-19 LAB TEST NON-CDC: HCPCS | Performed by: NURSE PRACTITIONER

## 2025-03-19 RX ORDER — BENZONATATE 100 MG/1
100 CAPSULE ORAL 3 TIMES DAILY PRN
Qty: 30 CAPSULE | Refills: 0 | Status: SHIPPED | OUTPATIENT
Start: 2025-03-19 | End: 2025-04-18

## 2025-03-19 RX ORDER — ALBUTEROL SULFATE 90 UG/1
2 INHALANT RESPIRATORY (INHALATION) EVERY 4 HOURS PRN
Qty: 1 EACH | Refills: 0 | Status: SHIPPED | OUTPATIENT
Start: 2025-03-19 | End: 2025-04-18

## 2025-03-19 RX ORDER — GUAIFENESIN 600 MG/1
1200 TABLET, EXTENDED RELEASE ORAL 2 TIMES DAILY
Qty: 20 TABLET | Refills: 0 | Status: SHIPPED | OUTPATIENT
Start: 2025-03-19 | End: 2025-03-24

## 2025-03-19 NOTE — ED INITIAL ASSESSMENT (HPI)
Cough for a few days. Concerned about some blood he coughed up this morning. No fever or body aches.

## 2025-03-19 NOTE — DISCHARGE INSTRUCTIONS
- Bronchitis is caused by a virus. Antibiotics do not treat this type of infection.  - Tessalon 1 capusule every 8 hours as needed.  - Take plain mucinex - 1200mg twice a day with a big glass of water  Albuterol: 2 puffs every 4-6 hours if needed for wheezing.   - Nasal saline - either spray (\"Ocean\" brand) or [facility] Med Sinus Rinse 3 times a day  Flonase: 2 sprays to each nostril in the AM.  - Rest and push fluids  - Hot lemon tea with honey  - Steam twice a day (either in shower or with cup of hot water and a towel over your head)     Please follow up with your primary care doctor in 1 week if not improving.    If any persistent, worsening or new/ concerning symptoms develop please go to the Emergency room.

## 2025-03-19 NOTE — ED PROVIDER NOTES
Patient Seen in: [facility]      History     Chief Complaint   Patient presents with    Cough/URI     Stated Complaint: cough    Subjective:   34-year-old male presents with complaint of a cough and nasal congestion that began 3 days ago.  This morning he he states his nose felt very dry upon awakening and he began to cough and blow his nose, he then spit blood-tinged sputum into the sink one time.    Patient denies smoking or vaping.  No history of asthma.  No recent air travel, or long car ride.    Patient further denies fever, chills, shortness of breath, dyspnea on exertion, chest pain, nausea, vomiting, diarrhea.    The history is provided by the patient.         Objective:     Past Medical History:    Allergic rhinitis              Past Surgical History:   Procedure Laterality Date    Appendectomy                  Social History     Socioeconomic History    Marital status:      Spouse name: Vera   Occupational History    Occupation:     Tobacco Use    Smoking status: Never    Smokeless tobacco: Current    Tobacco comments:     Chewing tobacco   Vaping Use    Vaping status: Never Used   Substance and Sexual Activity    Alcohol use: Yes     Comment: Occasionally    Drug use: Never    Sexual activity: Yes     Partners: Female   Other Topics Concern    Caffeine Concern No    Exercise No    Seat Belt No    Special Diet No    Stress Concern No    Weight Concern No              Review of Systems   Constitutional:  Negative for chills, diaphoresis and fever.   HENT:  Positive for congestion. Negative for sore throat and trouble swallowing.    Respiratory:  Positive for cough. Negative for chest tightness and shortness of breath.    Cardiovascular:  Negative for chest pain, palpitations and leg swelling.   Gastrointestinal:  Negative for abdominal pain, diarrhea, nausea and vomiting.       Positive for stated complaint: cough  Other systems are as noted in HPI.  Constitutional and vital  signs reviewed.      All other systems reviewed and negative except as noted above.    Physical Exam     ED Triage Vitals [03/19/25 0818]   /85   Pulse 75   Resp 20   Temp 98.2 °F (36.8 °C)   Temp src Oral   SpO2 97 %   O2 Device None (Room air)       Current Vitals:   Vital Signs  BP: 126/85  Pulse: 75  Resp: 20  Temp: 98.2 °F (36.8 °C)  Temp src: Oral    Oxygen Therapy  SpO2: 97 %  O2 Device: None (Room air)        Physical Exam  Vitals and nursing note reviewed.   Constitutional:       General: He is not in acute distress.     Appearance: Normal appearance. He is not ill-appearing, toxic-appearing or diaphoretic.   HENT:      Head: Normocephalic.      Right Ear: Tympanic membrane normal.      Left Ear: Tympanic membrane normal.      Nose: Congestion present. No rhinorrhea.      Mouth/Throat:      Mouth: Mucous membranes are moist.      Pharynx: Oropharynx is clear.   Eyes:      General: No scleral icterus.     Conjunctiva/sclera: Conjunctivae normal.   Cardiovascular:      Rate and Rhythm: Normal rate and regular rhythm.      Heart sounds: No murmur heard.  Pulmonary:      Effort: Pulmonary effort is normal. No respiratory distress.      Breath sounds: No stridor. Wheezing (faint expiratory wheezes RUL) present. No rhonchi or rales.   Chest:      Chest wall: No tenderness.   Musculoskeletal:      Cervical back: Normal range of motion and neck supple.   Lymphadenopathy:      Cervical: No cervical adenopathy.   Skin:     General: Skin is warm and dry.      Findings: No rash.   Neurological:      Mental Status: He is alert.   Psychiatric:         Mood and Affect: Mood normal.             ED Course     Labs Reviewed   RAPID SARS-COV-2 BY PCR - Normal     Rapid SARS-CoV-2 by PCR        Specimen Information: Nares; Other      Component Value Flag Ref Range Units Status    Rapid SARS-CoV-2 by PCR Not Detected      Not Detected  Final    Comment:    This test is intended for the qualitative detection of nucleic  acid from the SARS-CoV-2 viral RNA from individuals who are suspected of COVID-19 infection by their healthcare provider.    A \"Detected\" result is considered a positive test result for COVID-19.   A \"Not Detected\" result for this test means that SARS-CoV-2 RNA was not present in the sample above the limit of detection of the assay.    Test performed using the Abbott ID NOW COVID-19 assay performed on the ID NOW Instrument, [employer] [place], [place] [address].    This test is being used under the Food and Drug Administration's Emergency Use Authorization.    The authorized Fact Sheet for Healthcare Providers for this assay is available upon request from the laboratory.    [facility]   [address] [place] [address]   Phone: [phone]  Fax: [phone]  CLIA # [id number]                   XR CHEST PA + LAT CHEST (CPT=71046)          PROCEDURE:  XR CHEST PA + LAT CHEST (CPT=71046)     INDICATIONS:  acute cough, coughed up some blood this AM     COMPARISON:  None.     TECHNIQUE:  PA and lateral chest radiographs were obtained.     PATIENT STATED HISTORY: (As transcribed by Technologist)  Patient states he has had a cough for the past 3 days.          FINDINGS:  Normal heart size and pulmonary vascularity. No pleural effusion or pneumothorax. No lobar consolidation. Peribronchial thickening with mild hyperinflation could be related to bronchitis and/or asthma. Clinical correlation recommended.                   =====  CONCLUSION:  Peribronchial thickening with mild hyperinflation could be related to bronchitis and/or asthma. Clinical correlation recommended.     LOCATION:  [place]        Dictated by (CST): Emeka Hall MD on 3/19/2025 at 8:50 AM       Finalized by (CST): Emeka Hall MD on 3/19/2025 at 8:51 AM                 MDM      Medical Decision Making  34-year-old male presents with complaint of a cough and nasal congestion that  began 3 days ago.  This morning he he states his nose felt very dry upon awakening and he began to cough and blow his nose, he then spit blood-tinged sputum into the sink one time.    Differential diagnoses include bronchitis, pneumonia, COVID.  On exam patient is well-appearing with normal vitals, faint expiratory wheeze right upper lobe.  COVID-negative.  Chest x-ray reveals peribronchial cuffing.  No consolidation.  Will treat for bronchitis with Mucinex, albuterol, Tessalon, nasal saline, Flonase, steam, push fluids.  Urgent return precautions emphasized for any worsening of symptoms, or new concerning symptoms.  PCP follow-up if not improving as anticipated in the next week.  Patient agreeable to plan.    Amount and/or Complexity of Data Reviewed  External Data Reviewed: labs and notes.  Labs: ordered.  Radiology: ordered.    Risk  OTC drugs.  Prescription drug management.        Disposition and Plan     Clinical Impression:  1. Bronchitis    2. Cough         Disposition:  Discharge  3/19/2025  9:00 am    Follow-up:  Jennifer Dexter MD  [address] [place] [address]  [phone]    In 1 week  If symptoms worsen          Medications Prescribed:  Discharge Medication List as of 3/19/2025  9:00 AM        START taking these medications    Details   guaiFENesin ER (MUCINEX) 600 MG Oral Tablet 12 Hr Take 2 tablets (1,200 mg total) by mouth 2 (two) times daily for 5 days., Normal, Disp-20 tablet, R-0      benzonatate 100 MG Oral Cap Take 1 capsule (100 mg total) by mouth 3 (three) times daily as needed for cough., Normal, Disp-30 capsule, R-0      albuterol 108 (90 Base) MCG/ACT Inhalation Aero Soln Inhale 2 puffs into the lungs every 4 (four) hours as needed for Wheezing., Normal, Disp-1 each, R-0                 Supplementary Documentation:

## (undated) NOTE — LETTER
Date & Time: 12/2/2024, 9:17 AM  Patient: Jens Rothman  Encounter Provider(s):    Nelia Leblanc PA       To Whom It May Concern:    Jens Rothman was seen and treated in our department on 12/2/2024. He  would benefit from working remote until 12/09/24.    If you have any questions or concerns, please do not hesitate to call.        _____________________________  Physician/APC Signature